# Patient Record
Sex: FEMALE | URBAN - METROPOLITAN AREA
[De-identification: names, ages, dates, MRNs, and addresses within clinical notes are randomized per-mention and may not be internally consistent; named-entity substitution may affect disease eponyms.]

---

## 2019-06-13 ENCOUNTER — NEW PATIENT (OUTPATIENT)
Dept: URBAN - METROPOLITAN AREA CLINIC 30 | Facility: CLINIC | Age: 64
End: 2019-06-13
Payer: COMMERCIAL

## 2019-06-13 DIAGNOSIS — H00.024 HORDEOLUM INTERNUM (MEIBOMIAN GLAND DYSFUNCTION), LEFT UPPER LID: ICD-10-CM

## 2019-06-13 DIAGNOSIS — H00.025 HORDEOLUM INTERNUM (MEIBOMIAN GLAND DYSFUNCTION), LEFT LOWER LID: ICD-10-CM

## 2019-06-13 DIAGNOSIS — H00.022 HORDEOLUM INTERNUM (MEIBOMIAN GLAND DYSFUNCTION), RIGHT LOWER LID: ICD-10-CM

## 2019-06-13 PROCEDURE — 92002 INTRM OPH EXAM NEW PATIENT: CPT | Performed by: OPTOMETRIST

## 2019-06-13 RX ORDER — KETOROLAC TROMETHAMINE 5 MG/ML
0.5 % SOLUTION/ DROPS OPHTHALMIC
Qty: 1 | Refills: 0 | Status: INACTIVE
Start: 2019-06-13 | End: 2019-08-15

## 2019-06-13 ASSESSMENT — INTRAOCULAR PRESSURE
OD: 14
OS: 13

## 2019-08-15 ENCOUNTER — FOLLOW UP ESTABLISHED (OUTPATIENT)
Dept: URBAN - METROPOLITAN AREA CLINIC 30 | Facility: CLINIC | Age: 64
End: 2019-08-15
Payer: COMMERCIAL

## 2019-08-15 DIAGNOSIS — H43.813 VITREOUS DEGENERATION, BILATERAL: ICD-10-CM

## 2019-08-15 DIAGNOSIS — H11.153 PINGUECULA, BILATERAL: ICD-10-CM

## 2019-08-15 DIAGNOSIS — H25.13 AGE-RELATED NUCLEAR CATARACT, BILATERAL: Primary | ICD-10-CM

## 2019-08-15 PROCEDURE — 92134 CPTRZ OPH DX IMG PST SGM RTA: CPT | Performed by: OPTOMETRIST

## 2019-08-15 PROCEDURE — 92015 DETERMINE REFRACTIVE STATE: CPT | Performed by: OPTOMETRIST

## 2019-08-15 RX ORDER — KETOROLAC TROMETHAMINE 5 MG/ML
0.5 % SOLUTION/ DROPS OPHTHALMIC
Qty: 1 | Refills: 1 | Status: INACTIVE
Start: 2019-08-15 | End: 2019-11-19

## 2019-08-15 ASSESSMENT — KERATOMETRY
OS: 42.97
OD: 42.89

## 2019-08-15 ASSESSMENT — INTRAOCULAR PRESSURE
OS: 13
OD: 14

## 2019-08-15 ASSESSMENT — VISUAL ACUITY
OD: 20/30
OS: 20/30

## 2019-11-19 ENCOUNTER — FOLLOW UP ESTABLISHED (OUTPATIENT)
Dept: URBAN - METROPOLITAN AREA CLINIC 30 | Facility: CLINIC | Age: 64
End: 2019-11-19
Payer: COMMERCIAL

## 2019-11-19 DIAGNOSIS — H04.123 DRY EYE SYNDROME OF BILATERAL LACRIMAL GLANDS: Primary | ICD-10-CM

## 2019-11-19 DIAGNOSIS — H00.021 HORDEOLUM INTERNUM RIGHT UPPER EYELID: ICD-10-CM

## 2019-11-19 PROCEDURE — 99213 OFFICE O/P EST LOW 20 MIN: CPT | Performed by: OPTOMETRIST

## 2019-11-19 RX ORDER — KETOROLAC TROMETHAMINE 5 MG/ML
0.5 % SOLUTION/ DROPS OPHTHALMIC
Qty: 1 | Refills: 6 | Status: ACTIVE
Start: 2019-11-19

## 2019-11-19 ASSESSMENT — INTRAOCULAR PRESSURE
OD: 13
OS: 16

## 2023-05-08 ENCOUNTER — OFFICE VISIT (OUTPATIENT)
Dept: URBAN - METROPOLITAN AREA CLINIC 30 | Facility: CLINIC | Age: 68
End: 2023-05-08
Payer: COMMERCIAL

## 2023-05-08 DIAGNOSIS — H53.123 TRANSIENT VISUAL LOSS, BILATERAL: ICD-10-CM

## 2023-05-08 DIAGNOSIS — H35.362 DRUSEN (DEGENERATIVE) OF MACULA, LEFT EYE: ICD-10-CM

## 2023-05-08 DIAGNOSIS — H04.123 DRY EYE SYNDROME OF BILATERAL LACRIMAL GLANDS: ICD-10-CM

## 2023-05-08 DIAGNOSIS — H43.813 VITREOUS DEGENERATION, BILATERAL: ICD-10-CM

## 2023-05-08 DIAGNOSIS — H00.021 HORDEOLUM INTERNUM RIGHT UPPER EYELID: ICD-10-CM

## 2023-05-08 DIAGNOSIS — H25.13 AGE-RELATED NUCLEAR CATARACT, BILATERAL: Primary | ICD-10-CM

## 2023-05-08 DIAGNOSIS — H11.153 PINGUECULA, BILATERAL: ICD-10-CM

## 2023-05-08 PROCEDURE — 99203 OFFICE O/P NEW LOW 30 MIN: CPT | Performed by: OPTOMETRIST

## 2023-05-08 PROCEDURE — 92134 CPTRZ OPH DX IMG PST SGM RTA: CPT | Performed by: OPTOMETRIST

## 2023-05-08 ASSESSMENT — INTRAOCULAR PRESSURE
OS: 15
OD: 15

## 2023-05-08 ASSESSMENT — VISUAL ACUITY
OD: 20/30
OS: 20/40

## 2023-05-08 ASSESSMENT — KERATOMETRY
OD: 43.16
OS: 43.16

## 2023-05-08 NOTE — IMPRESSION/PLAN
Impression: Transient visual loss, bilateral: H53.123. Plan: Onset x 1 week ago. Episode lasted 15 minutes. Complete LOV, per pt. Pt went to ER- MRI was WNL, per pt. EKG also performed and was WNL. Monitor for now.

## 2023-05-08 NOTE — IMPRESSION/PLAN
Impression: Drusen (degenerative) of macula, left eye: H35.362. Plan: Mild. Cont to monitor. Recommend [employer] or Preservision Vitamins. Confirmed on MAC OCT.

## 2023-05-08 NOTE — IMPRESSION/PLAN
Impression: Tear film insufficiency of bilateral lacrimal glands Plan: Symptoms persist. 

PLAN: Continue AT's qid OU. O3's. Avoid ceiling fans. Acular BID OU.

## 2023-05-08 NOTE — IMPRESSION/PLAN
Impression: Vitreous degeneration, bilateral: H43.813. Plan: All signs and risks of retinal detachment or tears were discussed in detail. If pt notices any symptoms discussed or worsen, contact office ASAP. Recommend pt. return for normal recall. See MAC OCT.

## 2023-05-08 NOTE — IMPRESSION/PLAN
Impression: Hordeolum internum (Meibomian gland dysfunction), right upper lid Plan: Not using WC's qhs OU-recommend Michelle mask.